# Patient Record
Sex: FEMALE | Race: BLACK OR AFRICAN AMERICAN | NOT HISPANIC OR LATINO | URBAN - METROPOLITAN AREA
[De-identification: names, ages, dates, MRNs, and addresses within clinical notes are randomized per-mention and may not be internally consistent; named-entity substitution may affect disease eponyms.]

---

## 2021-05-10 ENCOUNTER — OFFICE VISIT (OUTPATIENT)
Dept: URBAN - METROPOLITAN AREA CLINIC 25 | Facility: CLINIC | Age: 76
End: 2021-05-10
Payer: MEDICARE

## 2021-05-10 ENCOUNTER — WEB ENCOUNTER (OUTPATIENT)
Dept: URBAN - METROPOLITAN AREA CLINIC 25 | Facility: CLINIC | Age: 76
End: 2021-05-10

## 2021-05-10 DIAGNOSIS — R12 HEARTBURN: ICD-10-CM

## 2021-05-10 DIAGNOSIS — K59.09 OTHER CONSTIPATION: ICD-10-CM

## 2021-05-10 DIAGNOSIS — R14.0 BLOATING: ICD-10-CM

## 2021-05-10 DIAGNOSIS — K57.20 DIVERTICULITIS OF LARGE INTESTINE WITH ABSCESS WITHOUT BLEEDING: ICD-10-CM

## 2021-05-10 DIAGNOSIS — D18.03 LIVER HEMANGIOMA: ICD-10-CM

## 2021-05-10 DIAGNOSIS — Z12.11 COLON CANCER SCREENING: ICD-10-CM

## 2021-05-10 PROCEDURE — 99214 OFFICE O/P EST MOD 30 MIN: CPT | Performed by: INTERNAL MEDICINE

## 2021-05-10 RX ORDER — OMEPRAZOLE 40 MG/1
CAPSULE, DELAYED RELEASE PELLETS ORAL
Qty: 0 | Refills: 0 | Status: ACTIVE | COMMUNITY
Start: 1900-01-01

## 2021-05-10 RX ORDER — FAMOTIDINE 40 MG/1
1 TABLET AT BEDTIME OR AS NEEDED FOR HEARTBURN TABLET, FILM COATED ORAL ONCE A DAY
Qty: 90 TABLET | Refills: 1 | OUTPATIENT
Start: 2021-05-10

## 2021-05-10 RX ORDER — SIMVASTATIN 40 MG/1
TAKE 1 TABLET (40 MG) BY ORAL ROUTE ONCE DAILY IN THE EVENING TABLET, FILM COATED ORAL 1
Qty: 0 | Refills: 0 | Status: ACTIVE | COMMUNITY
Start: 1900-01-01

## 2021-05-10 NOTE — HPI-OTHER HISTORIES
DEC 2018 -  Nov 2018 triple phase liver CT: stable cavernous hemangioma in right liver lobe measuring 1.8 x 2.3 cm. Amitiza - tried but makes pt dizzy. Describes intolerance to certain foods. 20 lbs in 2  yrs. Poor taste. Takes metamucil. Also daily MV. HX hysterectomy but not sure if this was complete / partial. Denies constipation. SHe reports excessive gas. c/o weight loss despite no documented weight loss.   NOV 2018 - CT w/o contrast April 2017: 2.5 cm benign hemangioma another smaller mass 1.4 x 0.7 cm probable hemangioma, several other small lesions, too small to characterize, presumably benign hemangiomas.  Patient reports bloating, gassiness, and early satiety / fullness in her abdomen. She has been using Metamucil. She is also needing daily stool softeners. She denies blood in stool. She has had stable weight in the last year. She is on Omperazole 40 mg daily.   MARCH 2017 -  Patient reports having undergone a colonoscopy in 2013 which was completely normal. Noted to have diverticular abscess in sigmoid colon on CT performed in Sept 2016.   Incomplete colonoscopy in Nov 2016. Extent of exam upto sigmoid colon. Sharp angulation as well as extensive sigmoid diverticulosis noted. Unable to advance scope beyond sigmoid colon.   Pt underwent a virtual colonoscopy in Dec 2016 revealed extensive diverticular disease including sigmoid diverticulosis with no colon mass or polyp > 6 mm. In addition a right lover lobe lesion approx 2.5 cm was noted. This liver lesion has been present atleast since CT performed in Sept 2016 which is suggestive of hemangioma.   Pt reported subjective weight loss ( though no documented changes in her weight have been noted since previous visit). She reported loss of appetite, poor po intake as well as heartburn despite being on Omeprazole 40 mg for several years. Therefore we performed EGD in Feb 2017 which revealed linear antral gastritis. No H Pylori. Normal esophageal biopsy.   She reports no symptoms today. No abdominal pain. Regular bowel movements.

## 2021-05-10 NOTE — HPI-TODAY'S VISIT:
May 2021 visit:  On omeprazole 40 mg since 6 yrs. Tried to stop for couple of weeks but this causes chest discomfort. no heartburn. no regurgitation. no dysphagia.  episodic chest spasms unrelated to food intake. cardiologist dr allyssa penaloza , was told everything was normal . Not sure if PPI helps. wants to stop.  Barium swallow in feb 2020 was normal. no hiatal hernia or esophageal dysmotility.   Patient underwent a EGD in December 2018 which revealed small gastric AVM, mildly erythematous appearing duodenal bulb, path from esophagus revealed mild reflux associated changes, gastric biopsy did not reveal any H. pylori, labs from December 2018 revealed slightly low TSH of 0.03 with a normal T4, CBC, CMP, B12, folate, vitamin D, ferritin

## 2021-06-02 ENCOUNTER — OFFICE VISIT (OUTPATIENT)
Dept: URBAN - METROPOLITAN AREA SURGERY CENTER 16 | Facility: SURGERY CENTER | Age: 76
End: 2021-06-02
Payer: MEDICARE

## 2021-06-02 ENCOUNTER — TELEPHONE ENCOUNTER (OUTPATIENT)
Dept: URBAN - METROPOLITAN AREA CLINIC 92 | Facility: CLINIC | Age: 76
End: 2021-06-02

## 2021-06-02 ENCOUNTER — LAB OUTSIDE AN ENCOUNTER (OUTPATIENT)
Dept: URBAN - METROPOLITAN AREA CLINIC 92 | Facility: CLINIC | Age: 76
End: 2021-06-02

## 2021-06-02 DIAGNOSIS — Z12.11 COLON CANCER SCREENING: ICD-10-CM

## 2021-06-02 DIAGNOSIS — Z53.8 FAILED ATTEMPTED SURGICAL PROCEDURE: ICD-10-CM

## 2021-06-02 PROCEDURE — G8907 PT DOC NO EVENTS ON DISCHARG: HCPCS | Performed by: INTERNAL MEDICINE

## 2021-06-02 PROCEDURE — G0121 COLON CA SCRN NOT HI RSK IND: HCPCS | Performed by: INTERNAL MEDICINE

## 2021-06-02 RX ORDER — SIMVASTATIN 40 MG/1
TAKE 1 TABLET (40 MG) BY ORAL ROUTE ONCE DAILY IN THE EVENING TABLET, FILM COATED ORAL 1
Qty: 0 | Refills: 0 | Status: ACTIVE | COMMUNITY
Start: 1900-01-01

## 2021-06-02 RX ORDER — OMEPRAZOLE 40 MG/1
CAPSULE, DELAYED RELEASE PELLETS ORAL
Qty: 0 | Refills: 0 | Status: ACTIVE | COMMUNITY
Start: 1900-01-01

## 2021-06-02 RX ORDER — FAMOTIDINE 40 MG/1
1 TABLET AT BEDTIME OR AS NEEDED FOR HEARTBURN TABLET, FILM COATED ORAL ONCE A DAY
Qty: 90 TABLET | Refills: 1 | Status: ACTIVE | COMMUNITY
Start: 2021-05-10

## 2022-08-19 ENCOUNTER — OFFICE VISIT (OUTPATIENT)
Dept: URBAN - METROPOLITAN AREA CLINIC 25 | Facility: CLINIC | Age: 77
End: 2022-08-19
Payer: MEDICARE

## 2022-08-19 ENCOUNTER — LAB OUTSIDE AN ENCOUNTER (OUTPATIENT)
Dept: URBAN - METROPOLITAN AREA CLINIC 25 | Facility: CLINIC | Age: 77
End: 2022-08-19

## 2022-08-19 VITALS
BODY MASS INDEX: 22.96 KG/M2 | WEIGHT: 137.8 LBS | TEMPERATURE: 97.7 F | HEIGHT: 65 IN | DIASTOLIC BLOOD PRESSURE: 83 MMHG | SYSTOLIC BLOOD PRESSURE: 152 MMHG | HEART RATE: 89 BPM

## 2022-08-19 DIAGNOSIS — R19.4 CHANGE IN BOWEL HABITS: ICD-10-CM

## 2022-08-19 DIAGNOSIS — R12 HEARTBURN: ICD-10-CM

## 2022-08-19 DIAGNOSIS — D18.03 LIVER HEMANGIOMA: ICD-10-CM

## 2022-08-19 DIAGNOSIS — R63.4 ABNORMAL WEIGHT LOSS: ICD-10-CM

## 2022-08-19 DIAGNOSIS — Z12.11 COLON CANCER SCREENING: ICD-10-CM

## 2022-08-19 DIAGNOSIS — R19.7 DIARRHEA: ICD-10-CM

## 2022-08-19 DIAGNOSIS — K57.20 DIVERTICULITIS OF LARGE INTESTINE WITH ABSCESS WITHOUT BLEEDING: ICD-10-CM

## 2022-08-19 DIAGNOSIS — K57.30 DIVERTICULOSIS LARGE INTESTINE W/O PERFORATION OR ABSCESS W/O BLEEDING: ICD-10-CM

## 2022-08-19 PROCEDURE — 99214 OFFICE O/P EST MOD 30 MIN: CPT | Performed by: INTERNAL MEDICINE

## 2022-08-19 RX ORDER — SIMVASTATIN 40 MG/1
TAKE 1 TABLET (40 MG) BY ORAL ROUTE ONCE DAILY IN THE EVENING TABLET, FILM COATED ORAL 1
Qty: 0 | Refills: 0 | Status: ACTIVE | COMMUNITY
Start: 1900-01-01

## 2022-08-19 RX ORDER — OMEPRAZOLE 40 MG/1
CAPSULE, DELAYED RELEASE PELLETS ORAL
Qty: 0 | Refills: 0 | Status: ACTIVE | COMMUNITY
Start: 1900-01-01

## 2022-08-19 RX ORDER — FAMOTIDINE 40 MG/1
1 TABLET AT BEDTIME OR AS NEEDED FOR HEARTBURN TABLET, FILM COATED ORAL ONCE A DAY
Qty: 90 TABLET | Refills: 1 | Status: ACTIVE | COMMUNITY
Start: 2021-05-10

## 2022-08-19 NOTE — HPI-TODAY'S VISIT:
August 22 visit:  Patient underwent a colonoscopy in 2021 which once again was incomplete up to the sigmoid colon with diverticulosis, a subsequent virtual colonoscopy in July 2021 revealed extensive colonic diverticulosis with somewhat limited evaluation of the sigmoid colon because of significant fecal debris but no obvious colon polyp or mass seen and a repeat CT colonography was advised in 3 years which would be 2024.  Patient was seen at [facility] 7/6/2022.  Hemoglobin 13.5, normal WBC, patient was prescribed antibiotics and diagnosed with UTI.  Started having diarrhea X 1 day. watery loose stools. non bloody.   on omeprazole 40 mg X 10 yrs.   Lost 30 lbs in 3 months.CAT scan abdomen pelvis with contrast in July 22 mentions a normal liver, pancreas, colonic diverticulosis without diverticulitis, no acute process.

## 2022-08-19 NOTE — HPI-OTHER HISTORIES
May 2021 visit:  On omeprazole 40 mg since 6 yrs. Tried to stop for couple of weeks but this causes chest discomfort. no heartburn. no regurgitation. no dysphagia.  episodic chest spasms unrelated to food intake. cardiologist dr allyssa penaloza , was told everything was normal . Not sure if PPI helps. wants to stop.  Barium swallow in feb 2020 was normal. no hiatal hernia or esophageal dysmotility.   Patient underwent a EGD in December 2018 which revealed small gastric AVM, mildly erythematous appearing duodenal bulb, path from esophagus revealed mild reflux associated changes, gastric biopsy did not reveal any H. pylori, labs from December 2018 revealed slightly low TSH of 0.03 with a normal T4, CBC, CMP, B12, folate, vitamin D, ferritin  DEC 2018 -  Nov 2018 triple phase liver CT: stable cavernous hemangioma in right liver lobe measuring 1.8 x 2.3 cm. Amitiza - tried but makes pt dizzy. Describes intolerance to certain foods. 20 lbs in 2  yrs. Poor taste. Takes metamucil. Also daily MV. HX hysterectomy but not sure if this was complete / partial. Denies constipation. SHe reports excessive gas. c/o weight loss despite no documented weight loss.   NOV 2018 - CT w/o contrast April 2017: 2.5 cm benign hemangioma another smaller mass 1.4 x 0.7 cm probable hemangioma, several other small lesions, too small to characterize, presumably benign hemangiomas.  Patient reports bloating, gassiness, and early satiety / fullness in her abdomen. She has been using Metamucil. She is also needing daily stool softeners. She denies blood in stool. She has had stable weight in the last year. She is on Omperazole 40 mg daily.   MARCH 2017 -  Patient reports having undergone a colonoscopy in 2013 which was completely normal. Noted to have diverticular abscess in sigmoid colon on CT performed in Sept 2016.   Incomplete colonoscopy in Nov 2016. Extent of exam upto sigmoid colon. Sharp angulation as well as extensive sigmoid diverticulosis noted. Unable to advance scope beyond sigmoid colon.   Pt underwent a virtual colonoscopy in Dec 2016 revealed extensive diverticular disease including sigmoid diverticulosis with no colon mass or polyp > 6 mm. In addition a right lover lobe lesion approx 2.5 cm was noted. This liver lesion has been present atleast since CT performed in Sept 2016 which is suggestive of hemangioma.   Pt reported subjective weight loss ( though no documented changes in her weight have been noted since previous visit). She reported loss of appetite, poor po intake as well as heartburn despite being on Omeprazole 40 mg for several years. Therefore we performed EGD in Feb 2017 which revealed linear antral gastritis. No H Pylori. Normal esophageal biopsy.   She reports no symptoms today. No abdominal pain. Regular bowel movements.

## 2022-08-24 ENCOUNTER — TELEPHONE ENCOUNTER (OUTPATIENT)
Dept: URBAN - METROPOLITAN AREA CLINIC 25 | Facility: CLINIC | Age: 77
End: 2022-08-24

## 2022-08-25 ENCOUNTER — TELEPHONE ENCOUNTER (OUTPATIENT)
Dept: URBAN - METROPOLITAN AREA CLINIC 25 | Facility: CLINIC | Age: 77
End: 2022-08-25

## 2022-08-26 ENCOUNTER — LAB OUTSIDE AN ENCOUNTER (OUTPATIENT)
Dept: URBAN - METROPOLITAN AREA CLINIC 25 | Facility: CLINIC | Age: 77
End: 2022-08-26

## 2022-08-31 LAB — GASTROINTESTINAL PATHOGEN: (no result)

## 2022-09-06 ENCOUNTER — TELEPHONE ENCOUNTER (OUTPATIENT)
Dept: URBAN - METROPOLITAN AREA CLINIC 25 | Facility: CLINIC | Age: 77
End: 2022-09-06

## 2022-09-15 ENCOUNTER — TELEPHONE ENCOUNTER (OUTPATIENT)
Dept: URBAN - METROPOLITAN AREA CLINIC 92 | Facility: CLINIC | Age: 77
End: 2022-09-15

## 2022-09-15 ENCOUNTER — WEB ENCOUNTER (OUTPATIENT)
Dept: URBAN - METROPOLITAN AREA SURGERY CENTER 20 | Facility: SURGERY CENTER | Age: 77
End: 2022-09-15

## 2022-09-15 ENCOUNTER — CLAIMS CREATED FROM THE CLAIM WINDOW (OUTPATIENT)
Dept: URBAN - METROPOLITAN AREA CLINIC 4 | Facility: CLINIC | Age: 77
End: 2022-09-15
Payer: MEDICARE

## 2022-09-15 ENCOUNTER — OFFICE VISIT (OUTPATIENT)
Dept: URBAN - METROPOLITAN AREA SURGERY CENTER 20 | Facility: SURGERY CENTER | Age: 77
End: 2022-09-15
Payer: MEDICARE

## 2022-09-15 DIAGNOSIS — R63.4 ABNORMAL INTENTIONAL WEIGHT LOSS: ICD-10-CM

## 2022-09-15 DIAGNOSIS — K90.0 CELIAC DISEASE: ICD-10-CM

## 2022-09-15 DIAGNOSIS — K90.1 SPRUE: ICD-10-CM

## 2022-09-15 DIAGNOSIS — R63.0 ALMOST NO APPETITE: ICD-10-CM

## 2022-09-15 PROBLEM — 82934008 CHRONIC IDIOPATHIC CONSTIPATION: Status: ACTIVE | Noted: 2022-09-15

## 2022-09-15 PROCEDURE — 88305 TISSUE EXAM BY PATHOLOGIST: CPT | Performed by: PATHOLOGY

## 2022-09-15 PROCEDURE — 88342 IMHCHEM/IMCYTCHM 1ST ANTB: CPT | Performed by: PATHOLOGY

## 2022-09-15 PROCEDURE — 43239 EGD BIOPSY SINGLE/MULTIPLE: CPT | Performed by: INTERNAL MEDICINE

## 2022-09-15 PROCEDURE — G8907 PT DOC NO EVENTS ON DISCHARG: HCPCS | Performed by: INTERNAL MEDICINE

## 2022-09-15 RX ORDER — SIMVASTATIN 40 MG/1
TAKE 1 TABLET (40 MG) BY ORAL ROUTE ONCE DAILY IN THE EVENING TABLET, FILM COATED ORAL 1
Qty: 0 | Refills: 0 | Status: ACTIVE | COMMUNITY
Start: 1900-01-01

## 2022-09-15 RX ORDER — FAMOTIDINE 40 MG/1
1 TABLET AT BEDTIME OR AS NEEDED FOR HEARTBURN TABLET, FILM COATED ORAL ONCE A DAY
Qty: 90 TABLET | Refills: 1 | Status: ACTIVE | COMMUNITY
Start: 2021-05-10

## 2022-09-15 RX ORDER — LUBIPROSTONE 24 UG/1
1 CAPSULE WITH FOOD AND WATER CAPSULE, GELATIN COATED ORAL TWICE A DAY
Qty: 180 CAPSULE | Refills: 2 | OUTPATIENT
Start: 2022-09-15 | End: 2023-06-12

## 2022-09-15 RX ORDER — OMEPRAZOLE 40 MG/1
CAPSULE, DELAYED RELEASE PELLETS ORAL
Qty: 0 | Refills: 0 | Status: ACTIVE | COMMUNITY
Start: 1900-01-01

## 2022-09-22 ENCOUNTER — TELEPHONE ENCOUNTER (OUTPATIENT)
Dept: URBAN - METROPOLITAN AREA CLINIC 6 | Facility: CLINIC | Age: 77
End: 2022-09-22

## 2022-10-03 ENCOUNTER — TELEPHONE ENCOUNTER (OUTPATIENT)
Dept: URBAN - METROPOLITAN AREA CLINIC 92 | Facility: CLINIC | Age: 77
End: 2022-10-03

## 2022-10-20 LAB
ADDITIONAL INFORMATION:: (no result)
COMMENT:: (no result)
DEAMIDATED GLIADIN ABS, IGA: 3
DEAMIDATED GLIADIN ABS, IGG: 2
DQ2 (DQA1 0501/0505,DQB1 02XX): NEGATIVE
DQ8 (DQA1 03XX, DQB1 0302): NEGATIVE
ENDOMYSIAL ANTIBODY IGA: NEGATIVE
IMMUNOGLOBULIN A, QN, SERUM: 148
T-TRANSGLUTAMINASE (TTG) IGA: <2
T-TRANSGLUTAMINASE (TTG) IGG: <2

## 2022-11-04 ENCOUNTER — OFFICE VISIT (OUTPATIENT)
Dept: URBAN - METROPOLITAN AREA CLINIC 25 | Facility: CLINIC | Age: 77
End: 2022-11-04
Payer: MEDICARE

## 2022-11-04 VITALS
WEIGHT: 139 LBS | HEIGHT: 65 IN | SYSTOLIC BLOOD PRESSURE: 151 MMHG | DIASTOLIC BLOOD PRESSURE: 83 MMHG | HEART RATE: 79 BPM | TEMPERATURE: 97.3 F | BODY MASS INDEX: 23.16 KG/M2

## 2022-11-04 DIAGNOSIS — R12 HEARTBURN: ICD-10-CM

## 2022-11-04 DIAGNOSIS — K57.30 DIVERTICULOSIS LARGE INTESTINE W/O PERFORATION OR ABSCESS W/O BLEEDING: ICD-10-CM

## 2022-11-04 DIAGNOSIS — D18.03 LIVER HEMANGIOMA: ICD-10-CM

## 2022-11-04 DIAGNOSIS — K57.20 DIVERTICULITIS OF LARGE INTESTINE WITH ABSCESS WITHOUT BLEEDING: ICD-10-CM

## 2022-11-04 DIAGNOSIS — R09.89 GLOBUS SENSATION: ICD-10-CM

## 2022-11-04 PROCEDURE — 99213 OFFICE O/P EST LOW 20 MIN: CPT | Performed by: INTERNAL MEDICINE

## 2022-11-04 RX ORDER — LUBIPROSTONE 24 UG/1
1 CAPSULE WITH FOOD AND WATER CAPSULE, GELATIN COATED ORAL TWICE A DAY
Qty: 180 CAPSULE | Refills: 2 | Status: ON HOLD | COMMUNITY
Start: 2022-09-15 | End: 2023-06-12

## 2022-11-04 RX ORDER — OMEPRAZOLE 40 MG/1
CAPSULE, DELAYED RELEASE PELLETS ORAL
Qty: 0 | Refills: 0 | Status: ACTIVE | COMMUNITY
Start: 1900-01-01

## 2022-11-04 RX ORDER — FAMOTIDINE 40 MG/1
1 TABLET AT BEDTIME OR AS NEEDED FOR HEARTBURN TABLET, FILM COATED ORAL ONCE A DAY
Qty: 90 TABLET | Refills: 1 | Status: ON HOLD | COMMUNITY
Start: 2021-05-10

## 2022-11-04 RX ORDER — PANTOPRAZOLE SODIUM 40 MG/1
1 TABLET TABLET, DELAYED RELEASE ORAL ONCE A DAY
Qty: 90 | Refills: 1 | OUTPATIENT
Start: 2022-11-04

## 2022-11-04 RX ORDER — SIMVASTATIN 40 MG/1
TAKE 1 TABLET (40 MG) BY ORAL ROUTE ONCE DAILY IN THE EVENING TABLET, FILM COATED ORAL 1
Qty: 0 | Refills: 0 | Status: ACTIVE | COMMUNITY
Start: 1900-01-01

## 2022-11-04 NOTE — HPI-TODAY'S VISIT:
November 22 visit: EGD was performed in September 22 which revealed 2 cm hiatal hernia otherwise normal exam, biopsy from small bowel revealed mild villous blunting with prominent intraepithelial lymphocytes. Stool studies in August 22 confirmed presence of norovirus. October 22 labs revealed no celiac disease antibodies and HLA negative for DQ 2 and DQ 8.  2 BM per day. diarrhea resolved. takes PPI for gerd symptoms. saw cardiologist dr. penaloza. also saw endocrine ( dr aristeo bennett). no abdominal pain.stable weight.

## 2022-11-04 NOTE — HPI-OTHER HISTORIES
August 22 visit:  Patient underwent a colonoscopy in June 2021 which once again was incomplete up to the sigmoid colon with diverticulosis, a subsequent virtual colonoscopy in July 2021 revealed extensive colonic diverticulosis with somewhat limited evaluation of the sigmoid colon because of significant fecal debris but no obvious colon polyp or mass seen and a repeat CT colonography was advised in 3 years which would be 2024.  Patient was seen at [facility] 7/6/2022.  Hemoglobin 13.5, normal WBC, patient was prescribed antibiotics and diagnosed with UTI.  Started having diarrhea X 1 day. watery loose stools. non bloody.   on omeprazole 40 mg X 10 yrs.   Lost 30 lbs in 3 months.CAT scan abdomen pelvis with contrast in July 22 mentions a normal liver, pancreas, colonic diverticulosis without diverticulitis, no acute process.  May 2021 visit:  On omeprazole 40 mg since 6 yrs. Tried to stop for couple of weeks but this causes chest discomfort. no heartburn. no regurgitation. no dysphagia.  episodic chest spasms unrelated to food intake. cardiologist dr allyssa penaloza , was told everything was normal . Not sure if PPI helps. wants to stop.  Barium swallow in feb 2020 was normal. no hiatal hernia or esophageal dysmotility.   Patient underwent a EGD in December 2018 which revealed small gastric AVM, mildly erythematous appearing duodenal bulb, path from esophagus revealed mild reflux associated changes, gastric biopsy did not reveal any H. pylori, labs from December 2018 revealed slightly low TSH of 0.03 with a normal T4, CBC, CMP, B12, folate, vitamin D, ferritin  DEC 2018 -  Nov 2018 triple phase liver CT: stable cavernous hemangioma in right liver lobe measuring 1.8 x 2.3 cm. Amitiza - tried but makes pt dizzy. Describes intolerance to certain foods. 20 lbs in 2  yrs. Poor taste. Takes metamucil. Also daily MV. HX hysterectomy but not sure if this was complete / partial. Denies constipation. SHe reports excessive gas. c/o weight loss despite no documented weight loss.   NOV 2018 - CT w/o contrast April 2017: 2.5 cm benign hemangioma another smaller mass 1.4 x 0.7 cm probable hemangioma, several other small lesions, too small to characterize, presumably benign hemangiomas.  Patient reports bloating, gassiness, and early satiety / fullness in her abdomen. She has been using Metamucil. She is also needing daily stool softeners. She denies blood in stool. She has had stable weight in the last year. She is on Omperazole 40 mg daily.   MARCH 2017 -  Patient reports having undergone a colonoscopy in 2013 which was completely normal. Noted to have diverticular abscess in sigmoid colon on CT performed in Sept 2016.   Incomplete colonoscopy in Nov 2016. Extent of exam upto sigmoid colon. Sharp angulation as well as extensive sigmoid diverticulosis noted. Unable to advance scope beyond sigmoid colon.   Pt underwent a virtual colonoscopy in Dec 2016 revealed extensive diverticular disease including sigmoid diverticulosis with no colon mass or polyp > 6 mm. In addition a right lover lobe lesion approx 2.5 cm was noted. This liver lesion has been present atleast since CT performed in Sept 2016 which is suggestive of hemangioma.   Pt reported subjective weight loss ( though no documented changes in her weight have been noted since previous visit). She reported loss of appetite, poor po intake as well as heartburn despite being on Omeprazole 40 mg for several years. Therefore we performed EGD in Feb 2017 which revealed linear antral gastritis. No H Pylori. Normal esophageal biopsy.   She reports no symptoms today. No abdominal pain. Regular bowel movements.

## 2023-02-13 ENCOUNTER — OFFICE VISIT (OUTPATIENT)
Dept: URBAN - METROPOLITAN AREA CLINIC 25 | Facility: CLINIC | Age: 78
End: 2023-02-13
Payer: MEDICARE

## 2023-02-13 VITALS
WEIGHT: 135 LBS | TEMPERATURE: 97.3 F | DIASTOLIC BLOOD PRESSURE: 78 MMHG | SYSTOLIC BLOOD PRESSURE: 142 MMHG | HEART RATE: 94 BPM | BODY MASS INDEX: 22.49 KG/M2 | HEIGHT: 65 IN

## 2023-02-13 DIAGNOSIS — D18.03 LIVER HEMANGIOMA: ICD-10-CM

## 2023-02-13 DIAGNOSIS — R12 HEARTBURN: ICD-10-CM

## 2023-02-13 DIAGNOSIS — Z12.11 COLON CANCER SCREENING: ICD-10-CM

## 2023-02-13 DIAGNOSIS — R09.89 GLOBUS SENSATION: ICD-10-CM

## 2023-02-13 DIAGNOSIS — K57.30 DIVERTICULOSIS LARGE INTESTINE W/O PERFORATION OR ABSCESS W/O BLEEDING: ICD-10-CM

## 2023-02-13 DIAGNOSIS — K57.20 DIVERTICULITIS OF LARGE INTESTINE WITH ABSCESS WITHOUT BLEEDING: ICD-10-CM

## 2023-02-13 PROBLEM — 398050005 DIVERTICULAR DISEASE OF COLON: Status: ACTIVE | Noted: 2022-08-19

## 2023-02-13 PROCEDURE — 99213 OFFICE O/P EST LOW 20 MIN: CPT | Performed by: INTERNAL MEDICINE

## 2023-02-13 RX ORDER — SIMVASTATIN 40 MG/1
TAKE 1 TABLET (40 MG) BY ORAL ROUTE ONCE DAILY IN THE EVENING TABLET, FILM COATED ORAL 1
Qty: 0 | Refills: 0 | Status: ACTIVE | COMMUNITY
Start: 1900-01-01

## 2023-02-13 RX ORDER — OMEPRAZOLE 40 MG/1
CAPSULE, DELAYED RELEASE PELLETS ORAL
Qty: 0 | Refills: 0 | Status: ON HOLD | COMMUNITY
Start: 1900-01-01

## 2023-02-13 RX ORDER — LUBIPROSTONE 24 UG/1
1 CAPSULE WITH FOOD AND WATER CAPSULE, GELATIN COATED ORAL TWICE A DAY
Qty: 180 CAPSULE | Refills: 2 | Status: ON HOLD | COMMUNITY
Start: 2022-09-15 | End: 2023-06-12

## 2023-02-13 RX ORDER — FAMOTIDINE 40 MG/1
1 TABLET AT BEDTIME OR AS NEEDED FOR HEARTBURN TABLET, FILM COATED ORAL ONCE A DAY
Qty: 90 TABLET | Refills: 1 | Status: ON HOLD | COMMUNITY
Start: 2021-05-10

## 2023-02-13 RX ORDER — PANTOPRAZOLE SODIUM 40 MG/1
1 TABLET TABLET, DELAYED RELEASE ORAL ONCE A DAY
Qty: 90 | Refills: 1 | Status: ACTIVE | COMMUNITY
Start: 2022-11-04

## 2023-02-13 NOTE — HPI-OTHER HISTORIES
November 22 visit: EGD was performed in September 22 which revealed 2 cm hiatal hernia otherwise normal exam, biopsy from small bowel revealed mild villous blunting with prominent intraepithelial lymphocytes. Stool studies in August 22 confirmed presence of norovirus. October 22 labs revealed no celiac disease antibodies and HLA negative for DQ 2 and DQ 8.  2 BM per day. diarrhea resolved. takes PPI for gerd symptoms. saw cardiologist dr. penaloza. also saw endocrine ( dr aristeo bennett). no abdominal pain.stable weight.  August 22 visit:  Patient underwent a colonoscopy in June 2021 which once again was incomplete up to the sigmoid colon with diverticulosis, a subsequent virtual colonoscopy in July 2021 revealed extensive colonic diverticulosis with somewhat limited evaluation of the sigmoid colon because of significant fecal debris but no obvious colon polyp or mass seen and a repeat CT colonography was advised in 3 years which would be 2024.  Patient was seen at [facility] 7/6/2022.  Hemoglobin 13.5, normal WBC, patient was prescribed antibiotics and diagnosed with UTI.  Started having diarrhea X 1 day. watery loose stools. non bloody.   on omeprazole 40 mg X 10 yrs.   Lost 30 lbs in 3 months.CAT scan abdomen pelvis with contrast in July 22 mentions a normal liver, pancreas, colonic diverticulosis without diverticulitis, no acute process.  May 2021 visit:  On omeprazole 40 mg since 6 yrs. Tried to stop for couple of weeks but this causes chest discomfort. no heartburn. no regurgitation. no dysphagia.  episodic chest spasms unrelated to food intake. cardiologist dr allyssa penaloza , was told everything was normal . Not sure if PPI helps. wants to stop.  Barium swallow in feb 2020 was normal. no hiatal hernia or esophageal dysmotility.   Patient underwent a EGD in December 2018 which revealed small gastric AVM, mildly erythematous appearing duodenal bulb, path from esophagus revealed mild reflux associated changes, gastric biopsy did not reveal any H. pylori, labs from December 2018 revealed slightly low TSH of 0.03 with a normal T4, CBC, CMP, B12, folate, vitamin D, ferritin  DEC 2018 -  Nov 2018 triple phase liver CT: stable cavernous hemangioma in right liver lobe measuring 1.8 x 2.3 cm. Amitiza - tried but makes pt dizzy. Describes intolerance to certain foods. 20 lbs in 2  yrs. Poor taste. Takes metamucil. Also daily MV. HX hysterectomy but not sure if this was complete / partial. Denies constipation. SHe reports excessive gas. c/o weight loss despite no documented weight loss.   NOV 2018 - CT w/o contrast April 2017: 2.5 cm benign hemangioma another smaller mass 1.4 x 0.7 cm probable hemangioma, several other small lesions, too small to characterize, presumably benign hemangiomas.  Patient reports bloating, gassiness, and early satiety / fullness in her abdomen. She has been using Metamucil. She is also needing daily stool softeners. She denies blood in stool. She has had stable weight in the last year. She is on Omperazole 40 mg daily.   MARCH 2017 -  Patient reports having undergone a colonoscopy in 2013 which was completely normal. Noted to have diverticular abscess in sigmoid colon on CT performed in Sept 2016.   Incomplete colonoscopy in Nov 2016. Extent of exam upto sigmoid colon. Sharp angulation as well as extensive sigmoid diverticulosis noted. Unable to advance scope beyond sigmoid colon.   Pt underwent a virtual colonoscopy in Dec 2016 revealed extensive diverticular disease including sigmoid diverticulosis with no colon mass or polyp > 6 mm. In addition a right lover lobe lesion approx 2.5 cm was noted. This liver lesion has been present atleast since CT performed in Sept 2016 which is suggestive of hemangioma.   Pt reported subjective weight loss ( though no documented changes in her weight have been noted since previous visit). She reported loss of appetite, poor po intake as well as heartburn despite being on Omeprazole 40 mg for several years. Therefore we performed EGD in Feb 2017 which revealed linear antral gastritis. No H Pylori. Normal esophageal biopsy.   She reports no symptoms today. No abdominal pain. Regular bowel movements.

## 2023-02-13 NOTE — HPI-TODAY'S VISIT:
feb 2023 visit: no abdominal symptoms. using metamucil daily. doing protonix 40 mg once a day. gerd symptoms better. no abdominal pain. 1 - 2 BM per day. No rectal bleeding.

## 2023-02-18 LAB — OCCULT BLOOD, FECAL, IA: (no result)

## 2023-02-19 ENCOUNTER — TELEPHONE ENCOUNTER (OUTPATIENT)
Dept: URBAN - METROPOLITAN AREA CLINIC 25 | Facility: CLINIC | Age: 78
End: 2023-02-19

## 2023-03-02 ENCOUNTER — LAB OUTSIDE AN ENCOUNTER (OUTPATIENT)
Dept: URBAN - METROPOLITAN AREA CLINIC 25 | Facility: CLINIC | Age: 78
End: 2023-03-02

## 2023-03-04 LAB — OCCULT BLOOD, FECAL, IA: NEGATIVE

## 2023-05-22 ENCOUNTER — OFFICE VISIT (OUTPATIENT)
Dept: URBAN - METROPOLITAN AREA CLINIC 25 | Facility: CLINIC | Age: 78
End: 2023-05-22
Payer: MEDICARE

## 2023-05-22 ENCOUNTER — WEB ENCOUNTER (OUTPATIENT)
Dept: URBAN - METROPOLITAN AREA CLINIC 25 | Facility: CLINIC | Age: 78
End: 2023-05-22

## 2023-05-22 VITALS
SYSTOLIC BLOOD PRESSURE: 150 MMHG | DIASTOLIC BLOOD PRESSURE: 84 MMHG | HEIGHT: 65 IN | TEMPERATURE: 98.1 F | WEIGHT: 137.6 LBS | BODY MASS INDEX: 22.92 KG/M2 | HEART RATE: 84 BPM

## 2023-05-22 DIAGNOSIS — R12 HEARTBURN: ICD-10-CM

## 2023-05-22 DIAGNOSIS — K57.30 DIVERTICULOSIS LARGE INTESTINE W/O PERFORATION OR ABSCESS W/O BLEEDING: ICD-10-CM

## 2023-05-22 DIAGNOSIS — Z12.11 COLON CANCER SCREENING: ICD-10-CM

## 2023-05-22 DIAGNOSIS — K58.1 IRRITABLE BOWEL SYNDROME WITH CONSTIPATION: ICD-10-CM

## 2023-05-22 DIAGNOSIS — R14.3 EXCESSIVE GAS: ICD-10-CM

## 2023-05-22 DIAGNOSIS — D18.03 LIVER HEMANGIOMA: ICD-10-CM

## 2023-05-22 PROBLEM — 440630006: Status: ACTIVE | Noted: 2023-05-22

## 2023-05-22 PROCEDURE — 99214 OFFICE O/P EST MOD 30 MIN: CPT | Performed by: INTERNAL MEDICINE

## 2023-05-22 RX ORDER — FAMOTIDINE 40 MG/1
1 TABLET AT BEDTIME OR AS NEEDED FOR HEARTBURN TABLET, FILM COATED ORAL ONCE A DAY
Qty: 90 TABLET | Refills: 1 | Status: ON HOLD | COMMUNITY
Start: 2021-05-10

## 2023-05-22 RX ORDER — SIMVASTATIN 40 MG/1
TAKE 1 TABLET (40 MG) BY ORAL ROUTE ONCE DAILY IN THE EVENING TABLET, FILM COATED ORAL 1
Qty: 0 | Refills: 0 | Status: ACTIVE | COMMUNITY
Start: 1900-01-01

## 2023-05-22 RX ORDER — METHYLPREDNISOLONE 4 MG/1
1 TABLET WITH FOOD OR MILK TABLET ORAL
Status: ACTIVE | COMMUNITY

## 2023-05-22 RX ORDER — OMEPRAZOLE 40 MG/1
CAPSULE, DELAYED RELEASE PELLETS ORAL
Qty: 0 | Refills: 0 | Status: ON HOLD | COMMUNITY
Start: 1900-01-01

## 2023-05-22 RX ORDER — PANTOPRAZOLE SODIUM 40 MG/1
1 TABLET TABLET, DELAYED RELEASE ORAL ONCE A DAY
Qty: 90 | Refills: 1 | Status: ACTIVE | COMMUNITY
Start: 2022-11-04

## 2023-05-22 RX ORDER — LUBIPROSTONE 24 UG/1
1 CAPSULE WITH FOOD AND WATER CAPSULE, GELATIN COATED ORAL TWICE A DAY
Qty: 180 CAPSULE | Refills: 2 | Status: ON HOLD | COMMUNITY
Start: 2022-09-15 | End: 2023-06-12

## 2023-05-22 RX ORDER — LUBIPROSTONE 8 UG/1
1 CAPSULE WITH FOOD AND WATER CAPSULE, GELATIN COATED ORAL TWICE A DAY
Qty: 180 CAPSULE | Refills: 3 | OUTPATIENT
Start: 2023-05-22 | End: 2024-05-16

## 2023-05-22 RX ORDER — MELOXICAM 15 MG/1
1 TABLET TABLET ORAL ONCE A DAY
Status: ACTIVE | COMMUNITY

## 2023-05-22 NOTE — HPI-TODAY'S VISIT:
May 23 visit: Occult blood fecal in March 2023 was negative. 1 episode of scant rectal bleeding last week. taking metaumcil but miralax is too strong. amitiza 8 mcg was effective but insurance stopped paying for it. stable weight on the scale. reports excessive gasiness. planned to get rt knee replacement. No abdominal pain. I spoke to her son - answered all qs. no gerd symptoms. no dysphagia. on our scale weight is stable since aug 2022. she declined repeat colonoscopy or repeat virtual CT.

## 2023-05-22 NOTE — HPI-OTHER HISTORIES
feb 2023 visit: no abdominal symptoms. using metamucil daily. doing protonix 40 mg once a day. gerd symptoms better. no abdominal pain. 1 - 2 BM per day. No rectal bleeding.  November 22 visit: EGD was performed in September 22 which revealed 2 cm hiatal hernia otherwise normal exam, biopsy from small bowel revealed mild villous blunting with prominent intraepithelial lymphocytes. Stool studies in August 22 confirmed presence of norovirus. October 22 labs revealed no celiac disease antibodies and HLA negative for DQ 2 and DQ 8.  2 BM per day. diarrhea resolved. takes PPI for gerd symptoms. saw cardiologist dr. penaloza. also saw endocrine ( dr aristeo bennett). no abdominal pain.stable weight.  August 22 visit:  Patient underwent a colonoscopy in June 2021 which once again was incomplete up to the sigmoid colon with diverticulosis, a subsequent virtual colonoscopy in July 2021 revealed extensive colonic diverticulosis with somewhat limited evaluation of the sigmoid colon because of significant fecal debris but no obvious colon polyp or mass seen and a repeat CT colonography was advised in 3 years which would be 2024.  Patient was seen at [facility] 7/6/2022.  Hemoglobin 13.5, normal WBC, patient was prescribed antibiotics and diagnosed with UTI.  Started having diarrhea X 1 day. watery loose stools. non bloody.   on omeprazole 40 mg X 10 yrs.   Lost 30 lbs in 3 months.CAT scan abdomen pelvis with contrast in July 22 mentions a normal liver, pancreas, colonic diverticulosis without diverticulitis, no acute process.  May 2021 visit:  On omeprazole 40 mg since 6 yrs. Tried to stop for couple of weeks but this causes chest discomfort. no heartburn. no regurgitation. no dysphagia.  episodic chest spasms unrelated to food intake. cardiologist dr allyssa penaloza , was told everything was normal . Not sure if PPI helps. wants to stop.  Barium swallow in feb 2020 was normal. no hiatal hernia or esophageal dysmotility.   Patient underwent a EGD in December 2018 which revealed small gastric AVM, mildly erythematous appearing duodenal bulb, path from esophagus revealed mild reflux associated changes, gastric biopsy did not reveal any H. pylori, labs from December 2018 revealed slightly low TSH of 0.03 with a normal T4, CBC, CMP, B12, folate, vitamin D, ferritin  DEC 2018 -  Nov 2018 triple phase liver CT: stable cavernous hemangioma in right liver lobe measuring 1.8 x 2.3 cm. Amitiza - tried but makes pt dizzy. Describes intolerance to certain foods. 20 lbs in 2  yrs. Poor taste. Takes metamucil. Also daily MV. HX hysterectomy but not sure if this was complete / partial. Denies constipation. SHe reports excessive gas. c/o weight loss despite no documented weight loss.   NOV 2018 - CT w/o contrast April 2017: 2.5 cm benign hemangioma another smaller mass 1.4 x 0.7 cm probable hemangioma, several other small lesions, too small to characterize, presumably benign hemangiomas.  Patient reports bloating, gassiness, and early satiety / fullness in her abdomen. She has been using Metamucil. She is also needing daily stool softeners. She denies blood in stool. She has had stable weight in the last year. She is on Omperazole 40 mg daily.   MARCH 2017 -  Patient reports having undergone a colonoscopy in 2013 which was completely normal. Noted to have diverticular abscess in sigmoid colon on CT performed in Sept 2016.   Incomplete colonoscopy in Nov 2016. Extent of exam upto sigmoid colon. Sharp angulation as well as extensive sigmoid diverticulosis noted. Unable to advance scope beyond sigmoid colon.   Pt underwent a virtual colonoscopy in Dec 2016 revealed extensive diverticular disease including sigmoid diverticulosis with no colon mass or polyp > 6 mm. In addition a right lover lobe lesion approx 2.5 cm was noted. This liver lesion has been present atleast since CT performed in Sept 2016 which is suggestive of hemangioma.   Pt reported subjective weight loss ( though no documented changes in her weight have been noted since previous visit). She reported loss of appetite, poor po intake as well as heartburn despite being on Omeprazole 40 mg for several years. Therefore we performed EGD in Feb 2017 which revealed linear antral gastritis. No H Pylori. Normal esophageal biopsy.   She reports no symptoms today. No abdominal pain. Regular bowel movements.

## 2023-09-11 ENCOUNTER — OFFICE VISIT (OUTPATIENT)
Dept: URBAN - METROPOLITAN AREA CLINIC 25 | Facility: CLINIC | Age: 78
End: 2023-09-11
Payer: MEDICARE

## 2023-09-11 VITALS
HEIGHT: 65 IN | TEMPERATURE: 97.5 F | BODY MASS INDEX: 22.76 KG/M2 | WEIGHT: 136.6 LBS | SYSTOLIC BLOOD PRESSURE: 131 MMHG | HEART RATE: 79 BPM | DIASTOLIC BLOOD PRESSURE: 79 MMHG

## 2023-09-11 DIAGNOSIS — R12 HEARTBURN: ICD-10-CM

## 2023-09-11 DIAGNOSIS — K58.1 IRRITABLE BOWEL SYNDROME WITH CONSTIPATION: ICD-10-CM

## 2023-09-11 DIAGNOSIS — D18.03 LIVER HEMANGIOMA: ICD-10-CM

## 2023-09-11 DIAGNOSIS — K57.30 DIVERTICULOSIS LARGE INTESTINE W/O PERFORATION OR ABSCESS W/O BLEEDING: ICD-10-CM

## 2023-09-11 DIAGNOSIS — Z12.11 COLON CANCER SCREENING: ICD-10-CM

## 2023-09-11 PROCEDURE — 99212 OFFICE O/P EST SF 10 MIN: CPT | Performed by: INTERNAL MEDICINE

## 2023-09-11 RX ORDER — SIMVASTATIN 40 MG/1
TAKE 1 TABLET (40 MG) BY ORAL ROUTE ONCE DAILY IN THE EVENING TABLET, FILM COATED ORAL 1
Qty: 0 | Refills: 0 | Status: ACTIVE | COMMUNITY
Start: 1900-01-01

## 2023-09-11 RX ORDER — LUBIPROSTONE 8 UG/1
1 CAPSULE WITH FOOD AND WATER CAPSULE, GELATIN COATED ORAL TWICE A DAY
Qty: 180 CAPSULE | Refills: 3 | Status: ON HOLD | COMMUNITY
Start: 2023-05-22 | End: 2024-05-16

## 2023-09-11 RX ORDER — MELOXICAM 15 MG/1
1 TABLET TABLET ORAL ONCE A DAY
Status: ON HOLD | COMMUNITY

## 2023-09-11 RX ORDER — FAMOTIDINE 40 MG/1
1 TABLET AT BEDTIME OR AS NEEDED FOR HEARTBURN TABLET, FILM COATED ORAL ONCE A DAY
Qty: 90 TABLET | Refills: 1 | Status: ON HOLD | COMMUNITY
Start: 2021-05-10

## 2023-09-11 RX ORDER — METHYLPREDNISOLONE 4 MG/1
1 TABLET WITH FOOD OR MILK TABLET ORAL
Status: ON HOLD | COMMUNITY

## 2023-09-11 RX ORDER — OMEPRAZOLE 40 MG/1
CAPSULE, DELAYED RELEASE PELLETS ORAL
Qty: 0 | Refills: 0 | Status: ACTIVE | COMMUNITY
Start: 1900-01-01

## 2023-09-11 NOTE — HPI-TODAY'S VISIT:
Sept 2023: amitiza was too strong so not using. stable weight. s/p rt knee replacement in june 2023. currently doing PT. currently taking PPI in am for gerd symptoms.

## 2023-09-11 NOTE — HPI-OTHER HISTORIES
May 23 visit: Occult blood fecal in March 2023 was negative. 1 episode of scant rectal bleeding last week. taking metaumcil but miralax is too strong. amitiza 8 mcg was effective but insurance stopped paying for it. stable weight on the scale. reports excessive gasiness. planned to get rt knee replacement. No abdominal pain. I spoke to her son - answered all qs. no gerd symptoms. no dysphagia. on our scale weight is stable since aug 2022. she declined repeat colonoscopy or repeat virtual CT.  feb 2023 visit: no abdominal symptoms. using metamucil daily. doing protonix 40 mg once a day. gerd symptoms better. no abdominal pain. 1 - 2 BM per day. No rectal bleeding.  November 22 visit: EGD was performed in September 22 which revealed 2 cm hiatal hernia otherwise normal exam, biopsy from small bowel revealed mild villous blunting with prominent intraepithelial lymphocytes. Stool studies in August 22 confirmed presence of norovirus. October 22 labs revealed no celiac disease antibodies and HLA negative for DQ 2 and DQ 8.  2 BM per day. diarrhea resolved. takes PPI for gerd symptoms. saw cardiologist dr. penaloza. also saw endocrine ( dr aristeo bennett). no abdominal pain.stable weight.  August 22 visit:  Patient underwent a colonoscopy in June 2021 which once again was incomplete up to the sigmoid colon with diverticulosis, a subsequent virtual colonoscopy in July 2021 revealed extensive colonic diverticulosis with somewhat limited evaluation of the sigmoid colon because of significant fecal debris but no obvious colon polyp or mass seen and a repeat CT colonography was advised in 3 years which would be 2024.  Patient was seen at [facility] 7/6/2022.  Hemoglobin 13.5, normal WBC, patient was prescribed antibiotics and diagnosed with UTI.  Started having diarrhea X 1 day. watery loose stools. non bloody.   on omeprazole 40 mg X 10 yrs.   Lost 30 lbs in 3 months.CAT scan abdomen pelvis with contrast in July 22 mentions a normal liver, pancreas, colonic diverticulosis without diverticulitis, no acute process.  May 2021 visit:  On omeprazole 40 mg since 6 yrs. Tried to stop for couple of weeks but this causes chest discomfort. no heartburn. no regurgitation. no dysphagia.  episodic chest spasms unrelated to food intake. cardiologist dr allyssa penaloza , was told everything was normal . Not sure if PPI helps. wants to stop.  Barium swallow in feb 2020 was normal. no hiatal hernia or esophageal dysmotility.   Patient underwent a EGD in December 2018 which revealed small gastric AVM, mildly erythematous appearing duodenal bulb, path from esophagus revealed mild reflux associated changes, gastric biopsy did not reveal any H. pylori, labs from December 2018 revealed slightly low TSH of 0.03 with a normal T4, CBC, CMP, B12, folate, vitamin D, ferritin  DEC 2018 -  Nov 2018 triple phase liver CT: stable cavernous hemangioma in right liver lobe measuring 1.8 x 2.3 cm. Amitiza - tried but makes pt dizzy. Describes intolerance to certain foods. 20 lbs in 2  yrs. Poor taste. Takes metamucil. Also daily MV. HX hysterectomy but not sure if this was complete / partial. Denies constipation. SHe reports excessive gas. c/o weight loss despite no documented weight loss.   NOV 2018 - CT w/o contrast April 2017: 2.5 cm benign hemangioma another smaller mass 1.4 x 0.7 cm probable hemangioma, several other small lesions, too small to characterize, presumably benign hemangiomas.  Patient reports bloating, gassiness, and early satiety / fullness in her abdomen. She has been using Metamucil. She is also needing daily stool softeners. She denies blood in stool. She has had stable weight in the last year. She is on Omperazole 40 mg daily.   MARCH 2017 -  Patient reports having undergone a colonoscopy in 2013 which was completely normal. Noted to have diverticular abscess in sigmoid colon on CT performed in Sept 2016.   Incomplete colonoscopy in Nov 2016. Extent of exam upto sigmoid colon. Sharp angulation as well as extensive sigmoid diverticulosis noted. Unable to advance scope beyond sigmoid colon.   Pt underwent a virtual colonoscopy in Dec 2016 revealed extensive diverticular disease including sigmoid diverticulosis with no colon mass or polyp > 6 mm. In addition a right lover lobe lesion approx 2.5 cm was noted. This liver lesion has been present atleast since CT performed in Sept 2016 which is suggestive of hemangioma.   Pt reported subjective weight loss ( though no documented changes in her weight have been noted since previous visit). She reported loss of appetite, poor po intake as well as heartburn despite being on Omeprazole 40 mg for several years. Therefore we performed EGD in Feb 2017 which revealed linear antral gastritis. No H Pylori. Normal esophageal biopsy.   She reports no symptoms today. No abdominal pain. Regular bowel movements.

## 2024-04-01 ENCOUNTER — OV EP (OUTPATIENT)
Dept: URBAN - METROPOLITAN AREA CLINIC 25 | Facility: CLINIC | Age: 79
End: 2024-04-01
Payer: MEDICARE

## 2024-04-01 VITALS
BODY MASS INDEX: 23.82 KG/M2 | SYSTOLIC BLOOD PRESSURE: 136 MMHG | DIASTOLIC BLOOD PRESSURE: 84 MMHG | HEIGHT: 65 IN | HEART RATE: 72 BPM | WEIGHT: 143 LBS

## 2024-04-01 DIAGNOSIS — R12 HEARTBURN: ICD-10-CM

## 2024-04-01 DIAGNOSIS — D18.03 LIVER HEMANGIOMA: ICD-10-CM

## 2024-04-01 DIAGNOSIS — K57.30 DIVERTICULOSIS LARGE INTESTINE W/O PERFORATION OR ABSCESS W/O BLEEDING: ICD-10-CM

## 2024-04-01 DIAGNOSIS — K58.1 IRRITABLE BOWEL SYNDROME WITH CONSTIPATION: ICD-10-CM

## 2024-04-01 DIAGNOSIS — Z12.11 COLON CANCER SCREENING: ICD-10-CM

## 2024-04-01 PROCEDURE — 99213 OFFICE O/P EST LOW 20 MIN: CPT | Performed by: INTERNAL MEDICINE

## 2024-04-01 RX ORDER — LUBIPROSTONE 8 UG/1
1 CAPSULE WITH FOOD AND WATER CAPSULE, GELATIN COATED ORAL TWICE A DAY
Qty: 180 CAPSULE | Refills: 3 | Status: ON HOLD | COMMUNITY
Start: 2023-05-22 | End: 2024-05-16

## 2024-04-01 RX ORDER — FAMOTIDINE 40 MG/1
1 TABLET AT BEDTIME OR AS NEEDED FOR HEARTBURN TABLET, FILM COATED ORAL ONCE A DAY
Qty: 90 TABLET | Refills: 1 | Status: ON HOLD | COMMUNITY
Start: 2021-05-10

## 2024-04-01 RX ORDER — OMEPRAZOLE 40 MG/1
CAPSULE, DELAYED RELEASE PELLETS ORAL
Qty: 0 | Refills: 0 | Status: ACTIVE | COMMUNITY
Start: 1900-01-01

## 2024-04-01 RX ORDER — MELOXICAM 15 MG/1
1 TABLET TABLET ORAL ONCE A DAY
Status: ON HOLD | COMMUNITY

## 2024-04-01 RX ORDER — OMEPRAZOLE 20 MG/1
1 CAPSULE 30 MINUTES BEFORE MORNING MEAL CAPSULE, DELAYED RELEASE ORAL ONCE A DAY
Qty: 90 | Refills: 5 | OUTPATIENT
Start: 2024-04-01

## 2024-04-01 RX ORDER — SIMVASTATIN 40 MG/1
TAKE 1 TABLET (40 MG) BY ORAL ROUTE ONCE DAILY IN THE EVENING TABLET, FILM COATED ORAL 1
Qty: 0 | Refills: 0 | Status: ACTIVE | COMMUNITY
Start: 1900-01-01

## 2024-04-01 RX ORDER — METHYLPREDNISOLONE 4 MG/1
1 TABLET WITH FOOD OR MILK TABLET ORAL
Status: ON HOLD | COMMUNITY

## 2024-04-01 NOTE — HPI-TODAY'S VISIT:
April 2024: takes PPI prn. regular bowels. gained weight. no rectal bleeding. no abdominal pain. seeing pcp in june for physical.

## 2024-04-01 NOTE — HPI-OTHER HISTORIES
Sept 2023: amitiza was too strong so not using. stable weight. s/p rt knee replacement in june 2023. currently doing PT. currently taking PPI in am for gerd symptoms.  May 23 visit: Occult blood fecal in March 2023 was negative. 1 episode of scant rectal bleeding last week. taking metaumcil but miralax is too strong. amitiza 8 mcg was effective but insurance stopped paying for it. stable weight on the scale. reports excessive gasiness. planned to get rt knee replacement. No abdominal pain. I spoke to her son - answered all qs. no gerd symptoms. no dysphagia. on our scale weight is stable since aug 2022. she declined repeat colonoscopy or repeat virtual CT.  feb 2023 visit: no abdominal symptoms. using metamucil daily. doing protonix 40 mg once a day. gerd symptoms better. no abdominal pain. 1 - 2 BM per day. No rectal bleeding.  November 22 visit: EGD was performed in September 22 which revealed 2 cm hiatal hernia otherwise normal exam, biopsy from small bowel revealed mild villous blunting with prominent intraepithelial lymphocytes. Stool studies in August 22 confirmed presence of norovirus. October 22 labs revealed no celiac disease antibodies and HLA negative for DQ 2 and DQ 8.  2 BM per day. diarrhea resolved. takes PPI for gerd symptoms. saw cardiologist dr. penaloza. also saw endocrine ( dr aristeo bennett). no abdominal pain.stable weight.  August 22 visit:  Patient underwent a colonoscopy in June 2021 which once again was incomplete up to the sigmoid colon with diverticulosis, a subsequent virtual colonoscopy in July 2021 revealed extensive colonic diverticulosis with somewhat limited evaluation of the sigmoid colon because of significant fecal debris but no obvious colon polyp or mass seen and a repeat CT colonography was advised in 3 years which would be 2024.  Patient was seen at [facility] 7/6/2022.  Hemoglobin 13.5, normal WBC, patient was prescribed antibiotics and diagnosed with UTI.  Started having diarrhea X 1 day. watery loose stools. non bloody.   on omeprazole 40 mg X 10 yrs.   Lost 30 lbs in 3 months.CAT scan abdomen pelvis with contrast in July 22 mentions a normal liver, pancreas, colonic diverticulosis without diverticulitis, no acute process.  May 2021 visit:  On omeprazole 40 mg since 6 yrs. Tried to stop for couple of weeks but this causes chest discomfort. no heartburn. no regurgitation. no dysphagia.  episodic chest spasms unrelated to food intake. cardiologist dr alylssa penaloza , was told everything was normal . Not sure if PPI helps. wants to stop.  Barium swallow in feb 2020 was normal. no hiatal hernia or esophageal dysmotility.   Patient underwent a EGD in December 2018 which revealed small gastric AVM, mildly erythematous appearing duodenal bulb, path from esophagus revealed mild reflux associated changes, gastric biopsy did not reveal any H. pylori, labs from December 2018 revealed slightly low TSH of 0.03 with a normal T4, CBC, CMP, B12, folate, vitamin D, ferritin  DEC 2018 -  Nov 2018 triple phase liver CT: stable cavernous hemangioma in right liver lobe measuring 1.8 x 2.3 cm. Amitiza - tried but makes pt dizzy. Describes intolerance to certain foods. 20 lbs in 2  yrs. Poor taste. Takes metamucil. Also daily MV. HX hysterectomy but not sure if this was complete / partial. Denies constipation. SHe reports excessive gas. c/o weight loss despite no documented weight loss.   NOV 2018 - CT w/o contrast April 2017: 2.5 cm benign hemangioma another smaller mass 1.4 x 0.7 cm probable hemangioma, several other small lesions, too small to characterize, presumably benign hemangiomas.  Patient reports bloating, gassiness, and early satiety / fullness in her abdomen. She has been using Metamucil. She is also needing daily stool softeners. She denies blood in stool. She has had stable weight in the last year. She is on Omperazole 40 mg daily.   MARCH 2017 -  Patient reports having undergone a colonoscopy in 2013 which was completely normal. Noted to have diverticular abscess in sigmoid colon on CT performed in Sept 2016.   Incomplete colonoscopy in Nov 2016. Extent of exam upto sigmoid colon. Sharp angulation as well as extensive sigmoid diverticulosis noted. Unable to advance scope beyond sigmoid colon.   Pt underwent a virtual colonoscopy in Dec 2016 revealed extensive diverticular disease including sigmoid diverticulosis with no colon mass or polyp > 6 mm. In addition a right lover lobe lesion approx 2.5 cm was noted. This liver lesion has been present atleast since CT performed in Sept 2016 which is suggestive of hemangioma.   Pt reported subjective weight loss ( though no documented changes in her weight have been noted since previous visit). She reported loss of appetite, poor po intake as well as heartburn despite being on Omeprazole 40 mg for several years. Therefore we performed EGD in Feb 2017 which revealed linear antral gastritis. No H Pylori. Normal esophageal biopsy.   She reports no symptoms today. No abdominal pain. Regular bowel movements.

## 2024-10-09 ENCOUNTER — TELEPHONE ENCOUNTER (OUTPATIENT)
Dept: URBAN - METROPOLITAN AREA CLINIC 25 | Facility: CLINIC | Age: 79
End: 2024-10-09

## 2024-11-18 ENCOUNTER — OFFICE VISIT (OUTPATIENT)
Dept: URBAN - METROPOLITAN AREA CLINIC 25 | Facility: CLINIC | Age: 79
End: 2024-11-18

## 2024-11-25 ENCOUNTER — OFFICE VISIT (OUTPATIENT)
Dept: URBAN - METROPOLITAN AREA CLINIC 25 | Facility: CLINIC | Age: 79
End: 2024-11-25